# Patient Record
(demographics unavailable — no encounter records)

---

## 2024-10-10 NOTE — HISTORY OF PRESENT ILLNESS
[FreeTextEntry1] : Patient is a 48-year-old woman with history of type 2 diabetes, hypertension, hyperlipidemia, hypothyroidism here to follow-up for type 2 diabetes mellitus.  Diagnosed with type 2 diabetes at the age of 28. Strong family history of diabetes in both parents.  She is [place] in heritage. Currently DM Meds: Metformin 1000 mg BID Mounjaro 7.5 mg weekly (tolerating well, but has not noticed much weight loss). Semglee 40 units qhs (she self-decreased dosage, few times forgot to take Semglee BG values were still ok the next day) Glimepiride 4 mg BID (she self-increased back to 4 mg BID when she was in [place] due to higher carb diet there) Stopped Humalog about 5/2024 due to getting lows.  Previous DM meds: Ozempic 2.0mg once weekly (switched to Mounjaro) Humalog- did not like Humalog, felt it caused side effects such as severe hunger, weight gain, and felt "off" even while she was good with her diet and lifestyle.  Reports worsening glycemic control due to life stressors. She was dealing with +BRCA1 breast cancer s/p b/l mastectomy with reconstruction also s/p oophorectomy (uterus intact), then 's heart transplant, and now her mother is in the hospital for diabetic complications and she has been at the hospital with her very often. Feels very stressed, has not been able to have routine eating schedule, and has not been able to exercise (used to love and do Harris with friends). In the past she was able to control BG a lot better when she exercised regularly. There was also concern for intramucosal carcinoma but had repeat flex sig with biopsies that came back benign, f/b GI. Reports mom passed away last week after 3 months in the hospital during which she was started on dialysis, had leg amputated, and biopsy discovered calciphylaxis. Went to [place] in 6/2024 for her mom. She is still stressed about it all.   Regarding hyperlipidemia. Lipitor 20mg started 6/2018. She is tolerating it well. Also started on Vascepa last visit 4/2024, adherent to Vascepa 2 g daily now.  Ophtho: Due for ophtho. No diabetic retinopathy.  Feet: No neuropathy.   Renal: eGFR 114 (6/20/2024). UACR 16 (1/8/2024). Denies  infections.  Weight: stable around 139 lbs, thinks maybe gained 2 lbs since last visit.  Diet: Was fasting with vegetarian diet in 7/2024 so had more carbs than usual, she is back on good diet now. Wakes at 6am. breakfast (7-9am)- sometimes skips, may have 2 boiled eggs or coffee with rise mushroom and collagen, or sardines with avocado, or AG-1 greens powder lunch (2-3pm)- beyond burger with keto bread, salads with grilled chicken/fish, chickpeas and esha dinner (7-9pm)- chicken/fish/veggies, tofu, small amount of rice or cauliflower rice, beans bedtime snack (9-10pm)- triple zero yogurt (7g carbs, 15g protein), or sugar free wafer or sugar free cookie drinks- mostly water, no regular soda or juice Exercise: loves Harris, back doing Harris workouts regularly now!  She is on LT4 88mcg PO qam "for years". No change in BM's. No hair loss or dry skin. No c/o tremors or palpitations. Periods are regular. She states that she started having it a little bit less, getting once every 3 months.    She has a 14-year-old son.  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   who is also my patient had a heart transplant.  CGM (Dexcom G7): clarity code [id number] Downloaded and reviewed today (9/27/24-10/10/24): TIR 52%, very low <1%, low 0%, high 36%, very high 12%, GMI 7.6%, avg gluc 1481, SD 56, time CGM active 100%.  Pattern: occasional prandial hyperglycemia especially after lunch and dinner, may have some degree of aristeo phenomenon (fasting BG going up on its own), some lows overnight Hypoglycemia: Rare hypos, on report has some overnight hypos (according to patient may occur after light dinner followed by harris workout).   She is on calcium and vit D per GYN, does not take daily though.  Now on iron supplement from heme/onc. Saw cardiologist yesterday for palpitations, pending echo. Was recommended to consider increasing Mounjaro dosage.  Going to vacation this weekend to [place].

## 2024-10-10 NOTE — PHYSICAL EXAM
[TextEntry] :  Vital signs from today's encounter reviewed.  GENERAL: No acute distress, normal appearance HEAD: Normocephalic, atraumatic EYES: conjunctivae are pink and moist, no icterus, no proptosis  NECK: thyroid is not enlarged/nodular on palpation, non-tender, no adenopathy CARDIOVASCULAR: well-perfused extremities, no peripheral edema RESPIRATORY: normal chest expansion with good pulmonary effort, no acute respiratory distress MUSCULOSKELETAL: no swelling, normal range of motion, normal gait SKIN: no pallor, no icterus, no rash  NEUROLOGIC: alert and oriented, no evident focal deficits, no tremors  PSYCHIATRIC: mood and affect are normal ENDOCRINE: No obvious stigmata of Cushing's or acromegaly present

## 2024-10-10 NOTE — REASON FOR VISIT
[Follow - Up] : a follow-up visit [DM Type 2] : DM Type 2 [Home] : at home, [unfilled] , at the time of the visit. [Medical Office: ([facility])___] : at the medical office located in  [Patient] : the patient

## 2024-10-10 NOTE — ASSESSMENT
[FreeTextEntry1] : 48-year-old woman with history of type 2 diabetes, hypertension, hyperlipidemia, and hypothyroidism here for endocrinology follow-up  1.  Type 2 diabetes Uncontrolled, A1c 10.2% January 2024 -> 9.0% 4/4/2024 --> 7.6% (6/20/2024). Recheck A1c next in-person visit. Had a difficult year with her  undergoing cardiac transplant, he recently had a rejection, now most likely going to be back on the transplant list. Mother had extended hospitalization for DM complications and recently passed away from calciphylaxis, and patient has been stressed. Now working out more and eating better. She is very concerned about better glycemic control because she has seen the complications her mom experienced.   DM regimen: Continue Semglee 40 units at bedtime for now. Aim to downtitrate if possible. Increase Mounjaro to 10 mg weekly. Decrease glimepiride to 2 mg twice daily. If further overnight lows, advised to decrease further to 2 mg qam.  Continue Metformin 1000 mg twice daily Can remain off bolus insulin for now. Call if persistent highs/lows. Up-to-date with ophthalmology and podiatrist, annual follow-up recommended as well. Referral for ophtho provided. Resumed exercise/Gabriel classes. Continue CGM Dexcom G7.  2.  Hypertension BP goal <130/80 eGFR 114 (6/20/2024). UACR 16 (1/8/2024). Not on BP medication May benefit from ACE inhibitor if indicated  3.  Hyperlipidemia Atorvastatin 20 mg once restarted early 2024, adherent. Fenofibrate 145 mg once daily on hold - recently on hold secondary to liver abnormalities. On Vascepa 2g daily.  Can repeat lipid panel next in-person visit.  3.  Hypothyroidism Currently on levothyroxine 88 mcg once daily. TFTs wnl (4/8/2024). Can repeat next in-person visit.  Scheduled in 6 weeks with Dr. Charles.  Hailey Clinton NP (Brenda)

## 2024-11-19 NOTE — HISTORY OF PRESENT ILLNESS
[FreeTextEntry1] : Patient is a 47 -year-old woman with history of type 2 diabetes, hypertension, hyperlipidemia, hypothyroidism here to follow-up for type 2 diabetes mellitus.  Diagnosed with type 2 diabetes at the age of 28. Strong family history of diabetes in both parents.  He is [place] in heritage. Currently on  Metformin 1000mg bid Ozempic 2.0mg once weekly  Basaglar 50 units daily  Glimpierdie 2mg twice daily   She's working out now.  Eating better as well.  (A little cheating during the holidays in terms of Carb but still watching Carbs carefully).    Regarding hyperlipidemia, patient is currently on statin. Atorvastatin 20mg once daily   She is on LT4 88mcg PO qam "for years". No change in BM's. No hair loss or dry skin.  She has a 14-year-old son.  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   who is also my patient had a heart transplant.  She took care of her mom when she was sick since Jan 2024.  She had surgery for nephrectomy.  And then she was walking very slow and she had calciphylaxis, her mother got an amputation.  Her mother had multiple infections.

## 2024-11-19 NOTE — ASSESSMENT
[FreeTextEntry1] : 48-year-old woman with history of type 2 diabetes, hypertension, hyperlipidemia, and hypothyroidism here for endocrinology follow-up  1.  Type 2 diabetes A1c 7.6% June 2024 A1c of 9.0% April 2024 A1c 10.2% January 2024 A1c 7.6% June 2023  Uncontrolled, A1c 10.2% in January 2024. Had a difficult year with her  undergoing cardiac transplant, he recently had a rejection, now most likely going to be back on the transplant list.  She will be working out more and eating better. DM regimen Mounjaro 10 mg once daily Metformin 1000 mg twice daily Glimepiride 2 mg twice daily Up-to-date with ophthalmology and podiatrist, annual follow-up recommended as well.  2.  Hypertension BP goal <130/80 Check albumin/creatinine ratio annually Not on BP medication May benefit from ACE inhibitor if indicated  3.  Hyperlipidemia Atorvastatin 20 mg once daily to be restarted Fenofibrate 145 mg once daily Recently on hold secondary to liver abnormalities  3.  Hypothyroidism Currently on levothyroxine 88 mcg once daily. Check TFT and adjust dosage as needed.  Follow-up with ACP in 3 months Follow-up with me in 6 months.

## 2024-12-09 NOTE — HISTORY OF PRESENT ILLNESS
[Disease: _____________________] : Disease: [unfilled] [T: ___] : T[unfilled] [N: ___] : N[unfilled] [AJCC Stage: ____] : AJCC Stage: [unfilled] [de-identified] : BRCA 1+. 2/28/2022-Routine mammogram negative.  8/17/2022-Screening breast MRI-scattered enhancing nonspecific foci in the right breast, but a new 5 mm enhancing nodule in the upper inner posterior right breast noted.  No suspicious findings in the left breast or axilla. 8/26/2022-Diagnostic breast ultrasound-no sonographic correlate to the 5 mm enhancing mass in the right breast described on MRI.  9/9/2022-MRI guided biopsy of the right breast lesion revealed invasive poorly differentiated ductal carcinoma, [place] score 8/9, with invasive tumor measuring at least 4 mm, with evidence of DCIS.  No LVI.  ER negative/TN negative/HER2 negative (0/1+). 10/19/2022-Status post bilateral nipple sparing mastectomy with reconstruction.  Pathology revealed a minute focus of atypical lobular hyperplasia in the left breast.  No residual invasive carcinoma in the right breast, though evidence of focal ductal carcinoma in situ measuring 0.7 cm in greatest dimension noted.  1 right axillary sentinel lymph node negative.  Adjuvant dose dense CMF was considered (patient also had opinion at [facility]), however, she opted not to proceed with chemotherapy.  1/26/2023-Status post BSO (benign pathology) in conjunction with robotic MENDEZ breast reconstruction.  Patient was under the medical oncology care of Dr. Reich until 1/2024. [de-identified] : Poorly differentiated ductal carcinoma [de-identified] : ER-/OR-/Her2-(0/1+) [de-identified] : BRCA1+ [de-identified] : Presents to discuss lab results. Since last lab results, she started FeS04 1 tablet.  Did not take Fe supplement for few weeks due to taking levothyroxine and having to space medications apart  Reports palpitations. Saw cardiologist, Dr. Benito. Trying to exercise, feels tired sometimes. Traveling for [place] Denies melena, epistaxis, chest pain, dizziness, LADD.  Has 1 son[place] now.  with h/o heart transplant-doing better now.

## 2024-12-09 NOTE — CONSULT LETTER
[Dear  ___] : Dear  [unfilled], [Courtesy Letter:] : I had the pleasure of seeing your patient, [unfilled], in my office today. [Please see my note below.] : Please see my note below. [Consult Closing:] : Thank you very much for allowing me to participate in the care of this patient.  If you have any questions, please do not hesitate to contact me. [Sincerely,] : Sincerely, [DrEdward  ___] : Dr. WILLSON [FreeTextEntry3] : Miriam Jimenes MD

## 2024-12-09 NOTE — REASON FOR VISIT
[Home] : at home, [unfilled] , at the time of the visit. [Medical Office: ([facility])___] : at the medical office located in  [Verbal consent obtained from patient] : the patient, [unfilled] [Initial Consultation] : an initial consultation [Spouse] : spouse [FreeTextEntry2] : breast cancer

## 2024-12-09 NOTE — ASSESSMENT
[FreeTextEntry1] : 11/19/24 and 12/3/24 Lab results reviewed, cardiology note reviewed  10/2022-Stage IA (TiaN0) Right breast poorly differentiated ductal carcinoma, ER-/NV-/Her2- (0/1+), s/p BlL mastectomies. BRCA1+. S/P BSO. -- have reviewed with patient her diagnosis/prognosis with breast cancer recurrence risks and management options.  Have discussed BRCA1 gene mutation with associated increased risks of breast, ovarian, prostate and pancreatic cancer.  Patient expressed her understanding of her genetic testing results and potential implications for family members. --clinically CORINNE.   --Surveillance continues, along with GI   # Iron deficiency anemia: Hx of thalassemia.  --Recent colonoscopy w/ final path: done, 8/9/2024: Tubular adenoma with focal incipient high grade dysplasia. Endoscopy was not done. Plan for repeat colonoscopy beginning 2025 per GI. --started FeS04 1 tab late September 2024. Stopped for few weeks due to interaction with levothyroxine. Restarted and taking at nights.  --Hgb 9s, iron 28, 7% saturation and ferritin 21; discussed options of parental Fe nutrition (venofer vs feraheme). Opted for venofer 200MG x4 infusions. Discussed side effects including but not limited to urticaria, palpitations, dizziness, flushing, hypotension. Patient will schedule appointments. Plan to repeat cbc+iron studies 4-6weeks after the date of last venofer infusion.   #Palpitations: anemia vs other etiology --following cardiologist-TTE, event monitor, CT coronary. Continue follow up.  Patient was given the opportunity to ask questions.  Her questions have been answered to her apparent satisfaction at this time.  She expressed her understanding and wishes to continue oncology follow-up.  -->RTO 6 months with Dr. Vargas, or earlier as needed, as clinically indicated. Repeat cbc+iron studies after last venofer infusion then discuss result.

## 2024-12-09 NOTE — PHYSICAL EXAM
[Fully active, able to carry on all pre-disease performance without restriction] : Status 0 - Fully active, able to carry on all pre-disease performance without restriction [Normal] : affect appropriate [de-identified] : b/l reconstructed breasts without palpable abnormality [de-identified] : alert and oriented; unable to complete remainder of assessment as this is a televisit.

## 2024-12-09 NOTE — HISTORY OF PRESENT ILLNESS
[Disease: _____________________] : Disease: [unfilled] [T: ___] : T[unfilled] [N: ___] : N[unfilled] [AJCC Stage: ____] : AJCC Stage: [unfilled] [de-identified] : BRCA 1+. 2/28/2022-Routine mammogram negative.  8/17/2022-Screening breast MRI-scattered enhancing nonspecific foci in the right breast, but a new 5 mm enhancing nodule in the upper inner posterior right breast noted.  No suspicious findings in the left breast or axilla. 8/26/2022-Diagnostic breast ultrasound-no sonographic correlate to the 5 mm enhancing mass in the right breast described on MRI.  9/9/2022-MRI guided biopsy of the right breast lesion revealed invasive poorly differentiated ductal carcinoma, [place] score 8/9, with invasive tumor measuring at least 4 mm, with evidence of DCIS.  No LVI.  ER negative/MD negative/HER2 negative (0/1+). 10/19/2022-Status post bilateral nipple sparing mastectomy with reconstruction.  Pathology revealed a minute focus of atypical lobular hyperplasia in the left breast.  No residual invasive carcinoma in the right breast, though evidence of focal ductal carcinoma in situ measuring 0.7 cm in greatest dimension noted.  1 right axillary sentinel lymph node negative.  Adjuvant dose dense CMF was considered (patient also had opinion at [facility]), however, she opted not to proceed with chemotherapy.  1/26/2023-Status post BSO (benign pathology) in conjunction with robotic MENDEZ breast reconstruction.  Patient was under the medical oncology care of Dr. Reich until 1/2024. [de-identified] : Poorly differentiated ductal carcinoma [de-identified] : ER-/UT-/Her2-(0/1+) [de-identified] : BRCA1+ [de-identified] : Presents to discuss lab results. Since last lab results, she started FeS04 1 tablet.  Did not take Fe supplement for few weeks due to taking levothyroxine and having to space medications apart  Reports palpitations. Saw cardiologist, Dr. Benito. Trying to exercise, feels tired sometimes. Traveling for [place] Denies melena, epistaxis, chest pain, dizziness, LADD.  Has 1 son-in [employer] now.  with h/o heart transplant-doing better now.

## 2024-12-09 NOTE — PHYSICAL EXAM
[Fully active, able to carry on all pre-disease performance without restriction] : Status 0 - Fully active, able to carry on all pre-disease performance without restriction [Normal] : affect appropriate [de-identified] : b/l reconstructed breasts without palpable abnormality [de-identified] : alert and oriented; unable to complete remainder of assessment as this is a televisit.

## 2024-12-09 NOTE — REASON FOR VISIT
[Home] : at home, [unfilled] , at the time of the visit. [Medical Office: ([facility])___] : at the medical office located in  [Verbal consent obtained from patient] : the patient, [unfilled] [Initial Consultation] : an initial consultation [Spouse] : spouse [FreeTextEntry2] : breast cancer

## 2024-12-09 NOTE — REVIEW OF SYSTEMS
[Diarrhea: Grade 0] : Diarrhea: Grade 0 [Negative] : Allergic/Immunologic [Fatigue] : fatigue [Palpitations] : palpitations [Chest Pain] : no chest pain [Shortness Of Breath] : no shortness of breath [SOB on Exertion] : no shortness of breath during exertion

## 2025-02-10 NOTE — REASON FOR VISIT
[Follow-Up: _____] : a [unfilled] follow-up visit [FreeTextEntry1] : s/p bilateral breast reconstruction with robot-assisted MENDEZ flaps 01/25/23.  Then s/p revision of bilateral breast reconstruction and abdominal donor sites on 4/26/23.

## 2025-02-11 NOTE — HISTORY OF PRESENT ILLNESS
[FreeTextEntry1] : Patient is a 49-year-old woman with history of type 2 diabetes, hypertension, hyperlipidemia, hypothyroidism here to follow-up for type 2 diabetes mellitus.  Diagnosed with type 2 diabetes at the age of 28. Strong family history of diabetes in both parents.  She is [place] in heritage. Currently DM Meds: -Metformin 1000 mg BID -Mounjaro 10 mg weekly (dose increased 10/2024, tolerating well, but has not noticed much weight loss) -Semglee 30 units qhs (she self-decreased dosage recently from 40 units less than 2 weeks ago) -Glimepiride 2 mg BID (she decreased from 4 mg BID to 2 mg BID about 1 month ago in 1/2025) Stopped Humalog about 5/2024 due to getting lows.  Previous DM meds: Ozempic 2.0 mg once weekly (switched to Mounjaro) Humalog- did not like Humalog, felt it caused side effects such as severe hunger, weight gain, and felt "off" even while she was good with her diet and lifestyle.  Reports worsening glycemic control due to life stressors. She was dealing with +BRCA1 breast cancer s/p b/l mastectomy with reconstruction also s/p oophorectomy (uterus intact), then 's heart transplant, and now her mother is in the hospital for diabetic complications and she has been at the hospital with her very often. Feels very stressed, has not been able to have routine eating schedule, and has not been able to exercise (used to love and do Gabriel with friends). In the past she was able to control BG a lot better when she exercised regularly. There was also concern for intramucosal carcinoma but had repeat flex sig with biopsies that came back benign, f/b GI. Reports mom passed away last week after 3 months in the hospital during which she was started on dialysis, had leg amputated, and biopsy discovered calciphylaxis. Went to [place] in 6/2024 for her mom. She is still stressed about it all.   Regarding hyperlipidemia. Lipitor 20mg started 6/2018. She is tolerating it well. Also started on Vascepa last visit 4/2024, adherent to Vascepa 2 g daily now.  Ophtho: UTD ophtho 1/16/2025. No diabetic retinopathy.  Feet: No neuropathy.   Renal: eGFR 114 (6/20/2024). UACR 16 (1/8/2024). Denies  infections.  Weight: stable around 139 lbs --> 137 lbs now. Her personal goal is 125 lbs. She wishes to lose more weight but feels stuck.  Diet: Was fasting with vegetarian diet in 7/2024 so had more carbs than usual, she is back on good diet now. Wakes at 6:30am, coffee with stevia at 8am breakfast (7-9am)- sometimes skips, may have 2 boiled eggs or coffee with rise mushroom and collagen, or sardines with avocado, or AG-1 greens powder lunch (2-3pm)- beyond burger with keto bread, salads with grilled chicken/fish, chickpeas and esha dinner (7-9pm)- chicken/fish/veggies, tofu, cauliflower rice, beans, occasionally almond flour roti bedtime snack (9-10pm)- triple zero yogurt (7g carbs, 15g protein), or sugar free wafer or sugar free cookie drinks- mostly water, no regular soda or juice Exercise: loves Gabriel, back doing Gabriel workouts regularly now! Exercising 4-5 times per week (gabriel and strength training)  She is on LT4 88mcg PO qam "for years". No change in BM's. No hair loss or dry skin. No c/o tremors or palpitations. Periods are regular. She states that she started having it a little bit less, getting once every 3 months.    She has a 14-year-old son.  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   who is also my patient had a heart transplant.  CGM (Dexcom G7): clarity code [id number] Downloaded and reviewed today (1/28/25-2/10/25): TIR 55%, very low 0%, low 0%, high 37%, very high 8%, GMI 7.6%, avg gluc 179, SD 47, time CGM active 96%.  Pattern: occasional prandial hyperglycemia especially after lunch and dinner, may have some degree of aristeo phenomenon (fasting BG going up on its own), some lows overnight Hypoglycemia: Rare hypos, on report has some overnight hypos (according to patient may occur after light dinner followed by gabriel workout).   She is on calcium and vit D per GYN, does not take daily though.  Now on iron supplement from heme/onc. Had Venofer infusion in 12/2024. She has thalassemia.  Trying Bonafide (with thermella).  Saw cardiologist for palpitations, pending echo.

## 2025-02-11 NOTE — HISTORY OF PRESENT ILLNESS
[FreeTextEntry1] : Patient is a 49-year-old woman with history of type 2 diabetes, hypertension, hyperlipidemia, hypothyroidism here to follow-up for type 2 diabetes mellitus.  Diagnosed with type 2 diabetes at the age of 28. Strong family history of diabetes in both parents.  She is [place] in heritage. Currently DM Meds: -Metformin 1000 mg BID -Mounjaro 10 mg weekly (dose increased 10/2024, tolerating well, but has not noticed much weight loss) -Semglee 30 units qhs (she self-decreased dosage recently from 40 units less than 2 weeks ago) -Glimepiride 2 mg BID (she decreased from 4 mg BID to 2 mg BID about 1 month ago in 1/2025) Stopped Humalog about 5/2024 due to getting lows.  Previous DM meds: Ozempic 2.0 mg once weekly (switched to Mounjaro) Humalog- did not like Humalog, felt it caused side effects such as severe hunger, weight gain, and felt "off" even while she was good with her diet and lifestyle.  Reports worsening glycemic control due to life stressors. She was dealing with +BRCA1 breast cancer s/p b/l mastectomy with reconstruction also s/p oophorectomy (uterus intact), then 's heart transplant, and now her mother is in the hospital for diabetic complications and she has been at the hospital with her very often. Feels very stressed, has not been able to have routine eating schedule, and has not been able to exercise (used to love and do Gabriel with friends). In the past she was able to control BG a lot better when she exercised regularly. There was also concern for intramucosal carcinoma but had repeat flex sig with biopsies that came back benign, f/b GI. Reports mom passed away last week after 3 months in the hospital during which she was started on dialysis, had leg amputated, and biopsy discovered calciphylaxis. Went to [place] in 6/2024 for her mom. She is still stressed about it all.   Regarding hyperlipidemia. Lipitor 20mg started 6/2018. She is tolerating it well. Also started on Vascepa last visit 4/2024, adherent to Vascepa 2 g daily now.  Ophtho: UTD ophtho 1/16/2025. No diabetic retinopathy.  Feet: No neuropathy.   Renal: eGFR 114 (6/20/2024). UACR 16 (1/8/2024). Denies  infections.  Weight: stable around 139 lbs --> 137 lbs now. Her personal goal is 125 lbs. She wishes to lose more weight but feels stuck.  Diet: Was fasting with vegetarian diet in 7/2024 so had more carbs than usual, she is back on good diet now. Wakes at 6:30am, coffee with stevia at 8am breakfast (7-9am)- sometimes skips, may have 2 boiled eggs or coffee with rise mushroom and collagen, or sardines with avocado, or AG-1 greens powder lunch (2-3pm)- beyond burger with keto bread, salads with grilled chicken/fish, chickpeas and esha dinner (7-9pm)- chicken/fish/veggies, tofu, cauliflower rice, beans, occasionally almond flour roti bedtime snack (9-10pm)- triple zero yogurt (7g carbs, 15g protein), or sugar free wafer or sugar free cookie drinks- mostly water, no regular soda or juice Exercise: loves Gabriel, back doing Gabriel workouts regularly now! Exercising 4-5 times per week (gabriel and strength training)  She is on LT4 88mcg PO qam "for years". No change in BM's. No hair loss or dry skin. No c/o tremors or palpitations. Periods are regular. She states that she started having it a little bit less, getting once every 3 months.    She has a 14-year-old son.  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   who is also my patient had a heart transplant.  CGM (Dexcom G7): clarity code [id number] Downloaded and reviewed today (1/28/25-2/10/25): TIR 55%, very low 0%, low 0%, high 37%, very high 8%, GMI 7.6%, avg gluc 179, SD 47, time CGM active 96%.  Pattern: occasional prandial hyperglycemia especially after lunch and dinner, may have some degree of aristeo phenomenon (fasting BG going up on its own), some lows overnight Hypoglycemia: Rare hypos, on report has some overnight hypos (according to patient may occur after light dinner followed by gabriel workout).   She is on calcium and vit D per GYN, does not take daily though.  Now on iron supplement from heme/onc. Had Venofer infusion in 12/2024. She has thalassemia.  Trying Bonafide (with thermella).  Saw cardiologist for palpitations, pending echo.

## 2025-02-11 NOTE — ASSESSMENT
[FreeTextEntry1] : 49-year-old woman with history of type 2 diabetes, hypertension, hyperlipidemia, and hypothyroidism here for endocrinology follow-up  1.  Type 2 diabetes Uncontrolled, A1c 10.2% January 2024 -> 9.0% 4/4/2024 --> 7.6% (6/20/2024) --> 8.4% (11/19/2024) --> 7.5% (2/10/2025).  Had a difficult year with her  undergoing cardiac transplant, he recently had a rejection, now most likely going to be back on the transplant list. Mother had extended hospitalization for DM complications and recently passed away from calciphylaxis, and patient has been stressed. Now working out more and eating better. She is very concerned about better glycemic control because she has seen the complications her mom experienced.   DM regimen: Continue Semglee 30 units at bedtime for now. Aim to downtitrate if possible. On Mounjaro 10 mg weekly. Plan to increase Mounjaro to 12.5 mg weekly if TG is not worsened, will check lipid panel first before titrating up. Continue glimepiride 2 mg twice daily for now. If any lows, advised to decrease further to 2 mg qam.  Continue Metformin 1000 mg twice daily Can remain off bolus insulin for now. Up-to-date with ophthalmology and podiatrist, annual follow-up recommended as well. UTD with ophtho. Resumed exercise/Gabriel classes. Continue CGM Dexcom G7. Call if persistent highs/lows.  2.  Hypertension BP goal <130/80 eGFR 114 (6/20/2024). UACR 16 (1/8/2024). Repeat UACR today. Not on BP medication May benefit from ACE inhibitor if indicated  3.  Hyperlipidemia Atorvastatin 20 mg once restarted early 2024, has not been taking for a while due to forgetting to take it. Fenofibrate 145 mg once daily on hold - recently on hold secondary to liver abnormalities. Vascepa 2g daily, has not been taking for a while due to forgetting to take it. Advised to resume atorvastatin and Vascepa. Can repeat lipid panel today.  3.  Hypothyroidism Currently on levothyroxine 88 mcg once daily. TFTs wnl (4/8/2024). Can repeat TFTs today.  F/u in 3 months with Dr. Charles.  Hailey Morrison "Vera" DESTINY Clinton

## 2025-02-11 NOTE — HISTORY OF PRESENT ILLNESS
[FreeTextEntry1] : Patient is a 49-year-old woman with history of type 2 diabetes, hypertension, hyperlipidemia, hypothyroidism here to follow-up for type 2 diabetes mellitus.  Diagnosed with type 2 diabetes at the age of 28. Strong family history of diabetes in both parents.  She is [place] in heritage. Currently DM Meds: -Metformin 1000 mg BID -Mounjaro 10 mg weekly (dose increased 10/2024, tolerating well, but has not noticed much weight loss) -Semglee 30 units qhs (she self-decreased dosage recently from 40 units less than 2 weeks ago) -Glimepiride 2 mg BID (she decreased from 4 mg BID to 2 mg BID about 1 month ago in 1/2025) Stopped Humalog about 5/2024 due to getting lows.  Previous DM meds: Ozempic 2.0 mg once weekly (switched to Mounjaro) Humalog- did not like Humalog, felt it caused side effects such as severe hunger, weight gain, and felt "off" even while she was good with her diet and lifestyle.  Reports worsening glycemic control due to life stressors. She was dealing with +BRCA1 breast cancer s/p b/l mastectomy with reconstruction also s/p oophorectomy (uterus intact), then 's heart transplant, and now her mother is in the hospital for diabetic complications and she has been at the hospital with her very often. Feels very stressed, has not been able to have routine eating schedule, and has not been able to exercise (used to love and do Gabriel with friends). In the past she was able to control BG a lot better when she exercised regularly. There was also concern for intramucosal carcinoma but had repeat flex sig with biopsies that came back benign, f/b GI. Reports mom passed away last week after 3 months in the hospital during which she was started on dialysis, had leg amputated, and biopsy discovered calciphylaxis. Went to [place] in 6/2024 for her mom. She is still stressed about it all.   Regarding hyperlipidemia. Lipitor 20mg started 6/2018. She is tolerating it well. Also started on Vascepa last visit 4/2024, adherent to Vascepa 2 g daily now.  Ophtho: UTD ophtho 1/16/2025. No diabetic retinopathy.  Feet: No neuropathy.   Renal: eGFR 114 (6/20/2024). UACR 16 (1/8/2024). Denies  infections.  Weight: stable around 139 lbs --> 137 lbs now. Her personal goal is 125 lbs. She wishes to lose more weight but feels stuck.  Diet: Was fasting with vegetarian diet in 7/2024 so had more carbs than usual, she is back on good diet now. Wakes at 6:30am, coffee with stevia at 8am breakfast (7-9am)- sometimes skips, may have 2 boiled eggs or coffee with rise mushroom and collagen, or sardines with avocado, or AG-1 greens powder lunch (2-3pm)- beyond burger with keto bread, salads with grilled chicken/fish, chickpeas and esha dinner (7-9pm)- chicken/fish/veggies, tofu, cauliflower rice, beans, occasionally almond flour roti bedtime snack (9-10pm)- triple zero yogurt (7g carbs, 15g protein), or sugar free wafer or sugar free cookie drinks- mostly water, no regular soda or juice Exercise: loves Gabriel, back doing Gabriel workouts regularly now! Exercising 4-5 times per week (gabriel and strength training)  She is on LT4 88mcg PO qam "for years". No change in BM's. No hair loss or dry skin. No c/o tremors or palpitations. Periods are regular. She states that she started having it a little bit less, getting once every 3 months.    She has a 14-year-old son.  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   who is also my patient had a heart transplant.  CGM (Dexcom G7): clarity code [id number] Downloaded and reviewed today (1/28/25-2/10/25): TIR 55%, very low 0%, low 0%, high 37%, very high 8%, GMI 7.6%, avg gluc 179, SD 47, time CGM active 96%.  Pattern: occasional prandial hyperglycemia especially after lunch and dinner, may have some degree of aristeo phenomenon (fasting BG going up on its own), some lows overnight Hypoglycemia: Rare hypos, on report has some overnight hypos (according to patient may occur after light dinner followed by gabriel workout).   She is on calcium and vit D per GYN, does not take daily though.  Now on iron supplement from heme/onc. Had Venofer infusion in 12/2024. She has thalassemia.  Trying Bonafide (with thermella).  Saw cardiologist for palpitations, pending echo.

## 2025-02-21 NOTE — REVIEW OF SYSTEMS
PRE-SEDATION ASSESSMENT    CONSENT  Consent for procedure and sedation obtained: Yes    MEDICAL HISTORY  Significant medical/surgical history: Yes  Past Complications with Sedation/Anesthesia: No  Significant Family History: No  Smoking History: No  Alcohol/Drug abuse: No  Possible Pregnancy (LMP): Not Applicable  Cardiac History: Yes  Respiratory History: Yes    PHYSICAL EXAM  History and Physical Reviewed: H&P completed today  Airway Risk History: No previous complications  Airway Anatomy : Class II  Heart : Normal  Lungs : Normal  LOC/Mental Status : Normal    OTHER FINDINGS  Reviewed current medications and allergies: Yes  Pertinent lab/diagnostic test reviewed: Yes    SEDATION RISK ASSESSMENT  Risk Status ASA: Class II - Normal patient with mild systemic disease  Plan for Sedation: Moderate Sedation  Indications for Procedure/Pre-Procedure Diagnosis and Planned Procedure: Colonoscopy for decompression  EKG Monitoring: Yes    NARRATIVE FINDINGS     
[Negative] : Heme/Lymph

## 2025-02-21 NOTE — HISTORY OF PRESENT ILLNESS
[FreeTextEntry1] : CPE Hypothyroidism Diabetes History of breast cancer  [de-identified] : This is a 49-year-old female with a past medical history significant for breast cancer with BRCA 1 positive, hypothyroidism, hypercholesterolemia, iron deficiency anemia, insulin-dependent diabetes, tubular adenoma of the colon.  She presents today for an annual physical.  She is status post oophorectomy, mastectomy.  She is following with endocrine regards to her diabetes and hypothyroidism.  She has required iron infusions for her anemia.  She also has a history of thalassemia.

## 2025-03-18 NOTE — ASSESSMENT
[FreeTextEntry1] : Lab work reviewed. #2) 10/2022-Stage IA (T1aN0) Right breast poorly differentiated ductal carcinoma, ER-/IL-/Her2- (0/1+), s/p BlL mastectomies. BRCA1+. S/P BSO. -- have reviewed with patient her diagnosis/prognosis with breast cancer recurrence risks and management options.  Have discussed BRCA1 gene mutation with associated increased risks of breast, ovarian, prostate and pancreatic cancer.  Patient expressed her understanding of her genetic testing results and potential implications for family members. --clinically CORINNE.   --Surveillance --continue f/u with GI for surveillance as well --lab work ordered  #2) anemia with low iron/h/o thalassemia-colonoscopy 8/9/2024: Tubular adenoma with focal incipient high grade dysplasia. Endoscopy was not done. Plan for repeat colonoscopy 2025 per GI. --started FeS04 1 tab late September 2024. Stopped due to interaction with levothyroxine. Opted for IV venofer course-last infusion 12/18/2025. Serum iron subsequently improved on 2/26/2025 lab work, and hemoglobin stable. --f/u with GI as recommended  #3) increased abs. lymphs-chronic, dating back at least to 7/2013 upon review of old lab work available. DDx. d/w patient. Add PB flow cytometry to next lab work. Explained that some LPDs may evolve over time.  Patient was given the opportunity to ask questions.  Her questions have been answered to her apparent satisfaction at this time.  She expressed her understanding and wishes to continue oncology follow-up.  -->RTO 6 months, or earlier as needed, as clinically indicated

## 2025-03-18 NOTE — PHYSICAL EXAM
[Fully active, able to carry on all pre-disease performance without restriction] : Status 0 - Fully active, able to carry on all pre-disease performance without restriction [Normal] : affect appropriate [de-identified] : non-toxic appearing [de-identified] : b/l reconstructed breasts without palpable abnormality

## 2025-03-18 NOTE — HISTORY OF PRESENT ILLNESS
[Disease: _____________________] : Disease: [unfilled] [T: ___] : T[unfilled] [N: ___] : N[unfilled] [AJCC Stage: ____] : AJCC Stage: [unfilled] [de-identified] : BRCA 1+. 2/28/2022-Routine mammogram negative.  8/17/2022-Screening breast MRI-scattered enhancing nonspecific foci in the right breast, but a new 5 mm enhancing nodule in the upper inner posterior right breast noted.  No suspicious findings in the left breast or axilla. 8/26/2022-Diagnostic breast ultrasound-no sonographic correlate to the 5 mm enhancing mass in the right breast described on MRI.  9/9/2022-MRI guided biopsy of the right breast lesion revealed invasive poorly differentiated ductal carcinoma, [place] score 8/9, with invasive tumor measuring at least 4 mm, with evidence of DCIS.  No LVI.  ER negative/TX negative/HER2 negative (0/1+). 10/19/2022-Status post bilateral nipple sparing mastectomy with reconstruction.  Pathology revealed a minute focus of atypical lobular hyperplasia in the left breast.  No residual invasive carcinoma in the right breast, though evidence of focal ductal carcinoma in situ measuring 0.7 cm in greatest dimension noted.  1 right axillary sentinel lymph node negative.  Adjuvant dose dense CMF was considered (patient also had opinion at [facility]), however, she opted not to proceed with chemotherapy.  1/26/2023-Status post BSO (benign pathology) in conjunction with robotic MENDEZ breast reconstruction.  Patient was under the medical oncology care of Dr. Reich until 1/2024. [de-identified] : Poorly differentiated ductal carcinoma [de-identified] : ER-/AR-/Her2-(0/1+) [de-identified] : BRCA1+ [de-identified] : S/P venofer infusions-last 12/18/24.  with h/o heart transplant-in hospital-rejecting again. May have had a stroke-RLE weakness. S/P colonoscopy-found polyp with high grade dysplasia with f/u scoping planned. She currently has no complaints of chest pain, shortness of breath, nausea/vomiting, abdominal/pelvic pain, vaginal bleeding or bone pain.  No neurologic complaints.  Has 1 son-in [employer] now.

## 2025-03-18 NOTE — HISTORY OF PRESENT ILLNESS
[Disease: _____________________] : Disease: [unfilled] [T: ___] : T[unfilled] [N: ___] : N[unfilled] [AJCC Stage: ____] : AJCC Stage: [unfilled] [de-identified] : BRCA 1+. 2/28/2022-Routine mammogram negative.  8/17/2022-Screening breast MRI-scattered enhancing nonspecific foci in the right breast, but a new 5 mm enhancing nodule in the upper inner posterior right breast noted.  No suspicious findings in the left breast or axilla. 8/26/2022-Diagnostic breast ultrasound-no sonographic correlate to the 5 mm enhancing mass in the right breast described on MRI.  9/9/2022-MRI guided biopsy of the right breast lesion revealed invasive poorly differentiated ductal carcinoma, [place] score 8/9, with invasive tumor measuring at least 4 mm, with evidence of DCIS.  No LVI.  ER negative/MS negative/HER2 negative (0/1+). 10/19/2022-Status post bilateral nipple sparing mastectomy with reconstruction.  Pathology revealed a minute focus of atypical lobular hyperplasia in the left breast.  No residual invasive carcinoma in the right breast, though evidence of focal ductal carcinoma in situ measuring 0.7 cm in greatest dimension noted.  1 right axillary sentinel lymph node negative.  Adjuvant dose dense CMF was considered ([place] [facility] however, she opted not to proceed with chemotherapy.  1/26/2023-Status post BSO (benign pathology) in conjunction with robotic MENDEZ breast reconstruction.  Patient was under the medical oncology care of Dr. Reich until 1/2024. [de-identified] : Poorly differentiated ductal carcinoma [de-identified] : ER-/DE-/Her2-(0/1+) [de-identified] : BRCA1+ [de-identified] : S/P venofer infusions-last 12/18/24.  with h/o heart transplant-in hospital-rejecting again. May have had a stroke-RLE weakness. S/P colonoscopy-found polyp with high grade dysplasia with f/u scoping planned. She currently has no complaints of chest pain, shortness of breath, nausea/vomiting, abdominal/pelvic pain, vaginal bleeding or bone pain.  No neurologic complaints.  Has 1 son-in [employer] now.

## 2025-03-18 NOTE — PHYSICAL EXAM
[Fully active, able to carry on all pre-disease performance without restriction] : Status 0 - Fully active, able to carry on all pre-disease performance without restriction [Normal] : affect appropriate [de-identified] : non-toxic appearing [de-identified] : b/l reconstructed breasts without palpable abnormality

## 2025-04-07 NOTE — HISTORY OF PRESENT ILLNESS
[FreeTextEntry1] : Dear Yonis,   I had the pleasure of seeing your patient TOYIN GUERRA for Cardiometabolic evaluation.   As you know, she  is a Pleasant, 48 year old - [employer] - with a past medical history of Obesity, Hyperlipidemia, T2DM, Elevated LFTs, Palpitations =============== =============== Obesity, Hyperlipidemia, T2DM, Elevated LFTs (FHx Early Heart Disease in Cousins) - Atorva 20  - Icosapent Ethyl  - Mounjaro  - Glimipiride   Palpitations - TTE  - Event Monitor  - CT Coronary    HTN - BP normally in 120s/70s ---------------------------------------------------------------------------- ---------------------------------------------------------------------------- 4-2025 CC: Heart Issues - Patient reports no chest pain, no localization to the sternum, no radiation to the neck/jaw, no alleviating nor worsening precipitants to CP, no assoc symptoms to CP no alleviating nor worsening precipitants to CP, no assoc symptoms to CP - Patient notes no associated SOB/Palps/Leg swelling - Reports No associated F/C/N/V/Headaches - Reports Normal Exercise Tolerance - Reports no medication changes - Reports normal mood/quality of life - Reports no associated midnight awakenings from cP - Reports no diet changes - Reports no associated body aches- Reports no recent colds/viruses- Recent labs/imaging reviewed- Relevant Family history reviewed Mother/Father - CVRisk Assessment for 10 Year ACC/AHA Pooled Risk Cohort Equation places this person at < 7.5% Risk of ASCVD  TTE --  - LVEF  Nl CTA Coronary 1-2025 - AU 2 AU; Non-obstructive On Mounjaro; Vascepa; Glimipiride

## 2025-04-07 NOTE — DISCUSSION/SUMMARY
[FreeTextEntry1] : In summary   Pleasant, 48 year old - [employer] - with a past medical history of Obesity, Hyperlipidemia, T2DM, Elevated LFTs, Palpitations =============== =============== Obesity, Hyperlipidemia, T2DM, Elevated LFTs (FHx Early Heart Disease in Cousins) - Atorva 20  - Icosapent Ethyl  - Mounjaro  - Glimipiride   Palpitations - TTE  - Event Monitor  - CT Coronary    HTN - BP normally in 120s/70s    Yonis, kind thanks for the referral.   Jeovany Benito MD [facility] JESSICA MELGOZA Director, Preventive Cardiology & Lipidology [facility] [place]                                                                                                                                                                                                                                                                     --                                                                                                                                                                                                                                                                                                                                                                                                                                                                                                                                                                                                                                                                                                                                        ----------- 15 minutes was provided for preventive counseling on healthy diet, weight maintenance, CV risk reduction. Specifically, and separate from other cardiovascular evaluation and treatment, we discussed h willingness to focus  on lifestyle changes, particularly dietary; including greater consumption of vegetables, fruits over saturated fats. We discussed appropriate follow up to monitor progress on these areas.     -                                                                                                             -                         -        -----------  [EKG obtained to assist in diagnosis and management of assessed problem(s)] : EKG obtained to assist in diagnosis and management of assessed problem(s)

## 2025-04-07 NOTE — DISCUSSION/SUMMARY
[FreeTextEntry1] : In summary   Pleasant, 48 year old - [employer] - with a past medical history of Obesity, Hyperlipidemia, T2DM, Elevated LFTs, Palpitations =============== =============== Obesity, Hyperlipidemia, T2DM, Elevated LFTs (FHx Early Heart Disease in Cousins) - Atorva 20  - Icosapent Ethyl  - Mounjaro  - Glimipiride   Palpitations - TTE  - Event Monitor  - CT Coronary    HTN - BP normally in 120s/70s    Yonis, kind thanks for the referral.   Jeovany Benito MD [facility] JESSICA MELGOZA Director, Preventive Cardiology & Lipidology [facility] [place]                                                                                                                                                                                                                                                                     --                                                                                                                                                                                                                                                                                                                                                                                                                                                                                                                                                                                                                                                                                                                                        ----------- 15 minutes was provided for preventive counseling on healthy diet, weight maintenance, CV risk reduction. Specifically, and separate from other cardiovascular evaluation and treatment, we discussed h willingness to focus  on lifestyle changes, particularly dietary; including greater consumption of vegetables, fruits over saturated fats. We discussed appropriate follow up to monitor progress on these areas.     -                                                                                                             -                         -        -----------  [EKG obtained to assist in diagnosis and management of assessed problem(s)] : EKG obtained to assist in diagnosis and management of assessed problem(s)

## 2025-05-27 NOTE — HISTORY OF PRESENT ILLNESS
[FreeTextEntry1] : Patient is a 49-year-old woman with history of type 2 diabetes, hypertension, hyperlipidemia, hypothyroidism here to follow-up for type 2 diabetes mellitus.  Diagnosed with type 2 diabetes at the age of 28. Strong family history of diabetes in both parents.  He is [place] in heritage. Currently on  Metformin 1000mg bid Mounjaro 12.5 Mg once weekly Basaglar 50 units daily  Glimpierdie 2mg twice daily Lantus 35 unit at bedtime.   She's working out now.  Eating better as well.  (A little cheating during the holidays in terms of Carb but still watching Carbs carefully).    Regarding hyperlipidemia, patient is currently on statin. Atorvastatin 20mg once daily   She is on LT4 88mcg PO qam "for years". No change in BM's. No hair loss or dry skin.  She has a 14-year-old son.  She will manage no fault insurance department [facility]   who is also my patient had a heart transplant.  She took care of her mom when she was sick since Jan 2024.  She had surgery for nephrectomy.  And then she was walking very slow and she had calciphylaxis, her mother got an amputation.  Her mother had multiple infections.    05/27/2025: Her mother passed away.  Her  was recently in the hospital last month.  In the morning, she has coffee and lemon water.  Around 12pm, lunch, she usually eats healthy lunch salad, she doesn't eat rice, cauliflower rice, she eats keto bread, she makes almond flour wrap with vegetables and protein.  Dinner similar food to lunch.  She's trying not to snack so much but if she does, she has peanut.  She tries not to have chocolate around her.

## 2025-05-27 NOTE — HISTORY OF PRESENT ILLNESS
[FreeTextEntry1] : Patient is a 49-year-old woman with history of type 2 diabetes, hypertension, hyperlipidemia, hypothyroidism here to follow-up for type 2 diabetes mellitus.  Diagnosed with type 2 diabetes at the age of 28. Strong family history of diabetes in both parents.  He is [place] in heritage. Currently on  Metformin 1000mg bid Mounjaro 12.5 Mg once weekly Basaglar 50 units daily  Glimpierdie 2mg twice daily Lantus 35 unit at bedtime.   She's working out now.  Eating better as well.  (A little cheating during the holidays in terms of Carb but still watching Carbs carefully).    Regarding hyperlipidemia, patient is currently on statin. Atorvastatin 20mg once daily   She is on LT4 88mcg PO qam "for years". No change in BM's. No hair loss or dry skin.  She has a 14-year-old son.  She will manage no fault insurance department with [facility].   who is also my patient had a heart transplant.  She took care of her mom when she was sick since Jan 2024.  She had surgery for nephrectomy.  And then she was walking very slow and she had calciphylaxis, her mother got an amputation.  Her mother had multiple infections.    05/27/2025: Her mother passed away.  Her  was recently in the hospital last month.  In the morning, she has coffee and lemon water.  Around 12pm, lunch, she usually eats healthy lunch salad, she doesn't eat rice, cauliflower rice, she eats keto bread, she makes almond flour wrap with vegetables and protein.  Dinner similar food to lunch.  She's trying not to snack so much but if she does, she has peanut.  She tries not to have chocolate around her.